# Patient Record
Sex: FEMALE | Race: WHITE
[De-identification: names, ages, dates, MRNs, and addresses within clinical notes are randomized per-mention and may not be internally consistent; named-entity substitution may affect disease eponyms.]

---

## 2022-03-18 PROBLEM — I83.93 VARICOSE VEINS OF BOTH LOWER EXTREMITIES: Status: ACTIVE | Noted: 2020-10-22

## 2022-03-19 PROBLEM — E78.5 HYPERLIPIDEMIA: Status: ACTIVE | Noted: 2020-10-22

## 2022-03-19 PROBLEM — F32.A DEPRESSION: Status: ACTIVE | Noted: 2020-10-22

## 2022-03-19 PROBLEM — G43.829 MENSTRUAL MIGRAINE WITHOUT STATUS MIGRAINOSUS, NOT INTRACTABLE: Status: ACTIVE | Noted: 2020-10-22

## 2022-03-20 PROBLEM — T43.205A ANTIDEPRESSANT DISCONTINUATION SYNDROME: Status: ACTIVE | Noted: 2021-12-01

## 2023-12-01 ENCOUNTER — OFFICE VISIT (OUTPATIENT)
Dept: INTERNAL MEDICINE CLINIC | Facility: CLINIC | Age: 38
End: 2023-12-01
Payer: COMMERCIAL

## 2023-12-01 VITALS
TEMPERATURE: 98.2 F | OXYGEN SATURATION: 98 % | WEIGHT: 201 LBS | HEART RATE: 69 BPM | SYSTOLIC BLOOD PRESSURE: 94 MMHG | BODY MASS INDEX: 30.46 KG/M2 | HEIGHT: 68 IN | DIASTOLIC BLOOD PRESSURE: 58 MMHG

## 2023-12-01 DIAGNOSIS — F33.42 RECURRENT MAJOR DEPRESSIVE DISORDER, IN FULL REMISSION (HCC): ICD-10-CM

## 2023-12-01 DIAGNOSIS — G43.829 MENSTRUAL MIGRAINE WITHOUT STATUS MIGRAINOSUS, NOT INTRACTABLE: ICD-10-CM

## 2023-12-01 DIAGNOSIS — Z98.84 HISTORY OF GASTRIC BYPASS: ICD-10-CM

## 2023-12-01 DIAGNOSIS — E66.9 CLASS 1 OBESITY WITHOUT SERIOUS COMORBIDITY WITH BODY MASS INDEX (BMI) OF 30.0 TO 30.9 IN ADULT, UNSPECIFIED OBESITY TYPE: ICD-10-CM

## 2023-12-01 DIAGNOSIS — J45.20 MILD INTERMITTENT ASTHMA WITHOUT COMPLICATION: ICD-10-CM

## 2023-12-01 DIAGNOSIS — E16.2 HYPOGLYCEMIA: ICD-10-CM

## 2023-12-01 DIAGNOSIS — E78.2 MIXED HYPERLIPIDEMIA: Primary | ICD-10-CM

## 2023-12-01 LAB
ALBUMIN SERPL-MCNC: 3.3 G/DL (ref 3.5–5)
ALBUMIN/GLOB SERPL: 1.1 (ref 0.4–1.6)
ALP SERPL-CCNC: 66 U/L (ref 50–136)
ALT SERPL-CCNC: 29 U/L (ref 12–65)
ANION GAP SERPL CALC-SCNC: 1 MMOL/L (ref 2–11)
AST SERPL-CCNC: 21 U/L (ref 15–37)
BASOPHILS # BLD: 0.1 K/UL (ref 0–0.2)
BASOPHILS NFR BLD: 1 % (ref 0–2)
BILIRUB SERPL-MCNC: 0.4 MG/DL (ref 0.2–1.1)
BUN SERPL-MCNC: 18 MG/DL (ref 6–23)
CALCIUM SERPL-MCNC: 9.3 MG/DL (ref 8.3–10.4)
CHLORIDE SERPL-SCNC: 107 MMOL/L (ref 101–110)
CHOLEST SERPL-MCNC: 198 MG/DL
CO2 SERPL-SCNC: 30 MMOL/L (ref 21–32)
CREAT SERPL-MCNC: 0.9 MG/DL (ref 0.6–1)
DIFFERENTIAL METHOD BLD: NORMAL
EOSINOPHIL # BLD: 0.2 K/UL (ref 0–0.8)
EOSINOPHIL NFR BLD: 2 % (ref 0.5–7.8)
ERYTHROCYTE [DISTWIDTH] IN BLOOD BY AUTOMATED COUNT: 12.2 % (ref 11.9–14.6)
FERRITIN SERPL-MCNC: 12 NG/ML (ref 8–388)
FOLATE SERPL-MCNC: 31.8 NG/ML (ref 3.1–17.5)
GLOBULIN SER CALC-MCNC: 3.1 G/DL (ref 2.8–4.5)
GLUCOSE SERPL-MCNC: 96 MG/DL (ref 65–100)
HCT VFR BLD AUTO: 43.3 % (ref 35.8–46.3)
HDLC SERPL-MCNC: 89 MG/DL (ref 40–60)
HDLC SERPL: 2.2
HGB BLD-MCNC: 14 G/DL (ref 11.7–15.4)
IMM GRANULOCYTES # BLD AUTO: 0.1 K/UL (ref 0–0.5)
IMM GRANULOCYTES NFR BLD AUTO: 1 % (ref 0–5)
IRON SERPL-MCNC: 128 UG/DL (ref 35–150)
LDLC SERPL CALC-MCNC: 96.8 MG/DL
LYMPHOCYTES # BLD: 1.4 K/UL (ref 0.5–4.6)
LYMPHOCYTES NFR BLD: 18 % (ref 13–44)
MCH RBC QN AUTO: 30.6 PG (ref 26.1–32.9)
MCHC RBC AUTO-ENTMCNC: 32.3 G/DL (ref 31.4–35)
MCV RBC AUTO: 94.5 FL (ref 82–102)
MONOCYTES # BLD: 0.4 K/UL (ref 0.1–1.3)
MONOCYTES NFR BLD: 5 % (ref 4–12)
NEUTS SEG # BLD: 5.8 K/UL (ref 1.7–8.2)
NEUTS SEG NFR BLD: 73 % (ref 43–78)
NRBC # BLD: 0 K/UL (ref 0–0.2)
PLATELET # BLD AUTO: 294 K/UL (ref 150–450)
PMV BLD AUTO: 9.8 FL (ref 9.4–12.3)
POTASSIUM SERPL-SCNC: 3.8 MMOL/L (ref 3.5–5.1)
PROT SERPL-MCNC: 6.4 G/DL (ref 6.3–8.2)
RBC # BLD AUTO: 4.58 M/UL (ref 4.05–5.2)
SODIUM SERPL-SCNC: 138 MMOL/L (ref 133–143)
TIBC SERPL-MCNC: 339 UG/DL (ref 250–450)
TRIGL SERPL-MCNC: 61 MG/DL (ref 35–150)
VIT B12 SERPL-MCNC: 1597 PG/ML (ref 193–986)
VLDLC SERPL CALC-MCNC: 12.2 MG/DL (ref 6–23)
WBC # BLD AUTO: 8 K/UL (ref 4.3–11.1)

## 2023-12-01 PROCEDURE — 99214 OFFICE O/P EST MOD 30 MIN: CPT | Performed by: INTERNAL MEDICINE

## 2023-12-01 RX ORDER — ALBUTEROL SULFATE 90 UG/1
1 AEROSOL, METERED RESPIRATORY (INHALATION) EVERY 4 HOURS PRN
Qty: 18 G | Refills: 3 | Status: SHIPPED | OUTPATIENT
Start: 2023-12-01

## 2023-12-01 RX ORDER — FERROUS SULFATE 325(65) MG
TABLET ORAL
COMMUNITY

## 2023-12-01 RX ORDER — PHENTERMINE HYDROCHLORIDE 37.5 MG/1
37.5 TABLET ORAL
Qty: 30 TABLET | Refills: 2 | Status: SHIPPED | OUTPATIENT
Start: 2023-12-01 | End: 2024-02-29

## 2023-12-01 ASSESSMENT — PATIENT HEALTH QUESTIONNAIRE - PHQ9
9. THOUGHTS THAT YOU WOULD BE BETTER OFF DEAD, OR OF HURTING YOURSELF: 0
SUM OF ALL RESPONSES TO PHQ QUESTIONS 1-9: 0
5. POOR APPETITE OR OVEREATING: 0
8. MOVING OR SPEAKING SO SLOWLY THAT OTHER PEOPLE COULD HAVE NOTICED. OR THE OPPOSITE, BEING SO FIGETY OR RESTLESS THAT YOU HAVE BEEN MOVING AROUND A LOT MORE THAN USUAL: 0
2. FEELING DOWN, DEPRESSED OR HOPELESS: 0
6. FEELING BAD ABOUT YOURSELF - OR THAT YOU ARE A FAILURE OR HAVE LET YOURSELF OR YOUR FAMILY DOWN: 0
10. IF YOU CHECKED OFF ANY PROBLEMS, HOW DIFFICULT HAVE THESE PROBLEMS MADE IT FOR YOU TO DO YOUR WORK, TAKE CARE OF THINGS AT HOME, OR GET ALONG WITH OTHER PEOPLE: 0
7. TROUBLE CONCENTRATING ON THINGS, SUCH AS READING THE NEWSPAPER OR WATCHING TELEVISION: 0
1. LITTLE INTEREST OR PLEASURE IN DOING THINGS: 0
4. FEELING TIRED OR HAVING LITTLE ENERGY: 0
SUM OF ALL RESPONSES TO PHQ9 QUESTIONS 1 & 2: 0
3. TROUBLE FALLING OR STAYING ASLEEP: 0

## 2023-12-01 ASSESSMENT — COLUMBIA-SUICIDE SEVERITY RATING SCALE - C-SSRS
5. HAVE YOU STARTED TO WORK OUT OR WORKED OUT THE DETAILS OF HOW TO KILL YOURSELF? DO YOU INTEND TO CARRY OUT THIS PLAN?: NO
4. HAVE YOU HAD THESE THOUGHTS AND HAD SOME INTENTION OF ACTING ON THEM?: NO
7. DID THIS OCCUR IN THE LAST THREE MONTHS: NO
3. HAVE YOU BEEN THINKING ABOUT HOW YOU MIGHT KILL YOURSELF?: NO

## 2023-12-01 NOTE — PROGRESS NOTES
Pau Hart D.O. [address], [address] [facility] [address]  Tel: [phone]    Office Visit: Follow Up     Patient Name: Isaac Dela Cruz   :  1985   MRN:   [id number]      Today's Date: 23 6:53 AM    Subjective     The patient is a 41y.o.-year-old female who presents for follow-up. Hyperlipidemia  -Lipid panel from 11/3/2021 showed a total cholesterol 182, HDL of 77, LDL of 94, triglycerides of 57  -she is trying to get back to the gym, she is going 3-5 days a week, she is drinking a lot of protein shakes, and she is trying to eat healthy. History of depression  -Patient was on Lexapro in the past, she weaned off this but had some difficulty with antidepressant discontinuation syndrome.  -She got  from the father of her children, and   her [employer]eb[employer]ee Dues" and is now going through another divorce. She states her weight is going up some. She states she thinks her depression is doing well and does not want to go back on medication. Obesity  -BMI 30.56, weight 201 lbs. Patient had appointment with bariatric surgery 2022 for follow-up 1 year status post laparoscopic gastric bypass surgery on 2021.  -She was to follow-up with bariatric surgery at the 1 year teo but did not follow-up.  -she is trying to get back to the gym, she is going 3-5 days a week, she is drinking a lot of protein shakes, and she is trying to eat healthy. Asthma  -Albuterol Inhaler prn, she rarely uses this. Menstrual migraines  -she is not having migraines anymore, she has an IUD. Varicose veins of lower extremities  -she states these are stable. Vitamin D deficiency  -she is not taking vitamin D, she is taking a bariatric vitamin. Health maintenance  -Patient is due for Pap smear    Today:   Patient states that her blood sugars have been low and that she passed out about a week ago when her son found her on the floor.   She hit her

## 2023-12-02 LAB — INSULIN SERPL-ACNC: 14.1 UIU/ML (ref 2.6–24.9)

## 2023-12-06 ENCOUNTER — TELEPHONE (OUTPATIENT)
Dept: INTERNAL MEDICINE CLINIC | Facility: CLINIC | Age: 38
End: 2023-12-06

## 2023-12-06 NOTE — TELEPHONE ENCOUNTER
Prior authorization approved Case ID: [id number]      Payer: [employer] Patient's Payer    [phone]   Your PA request has been approved. Additional information will be provided in the approval communication.  (Message [phone])   Approval Details    Authorized from December 4, 2023 to March 4, 2024      Electronic appeal: Not supported

## 2023-12-07 ENCOUNTER — TELEPHONE (OUTPATIENT)
Dept: INTERNAL MEDICINE CLINIC | Facility: CLINIC | Age: 38
End: 2023-12-07

## 2023-12-07 NOTE — TELEPHONE ENCOUNTER
Dr Rossy Manning called patient yesterday, but she had to go back to work and missed the call. Patient is off today and can be reached at anytime.

## 2024-02-28 ENCOUNTER — TELEPHONE (OUTPATIENT)
Dept: INTERNAL MEDICINE CLINIC | Facility: CLINIC | Age: 39
End: 2024-02-28

## 2024-02-28 NOTE — TELEPHONE ENCOUNTER
Attempt to contact patient to discuss their concerns was unsuccessful.  Voicemail made to return call.

## 2024-02-28 NOTE — TELEPHONE ENCOUNTER
----- Message from LaAlfred Burt sent at 2/27/2024 11:48 AM EST -----  Subject: Appointment Request    Reason for Call: Established Patient Appointment needed: Routine Existing   Condition Follow Up    QUESTIONS    Reason for appointment request? Available appointments did not meet   patient need     Additional Information for Provider? Pt would like to R/S her appt on   03/01/24 due pt is having a cleanse with her nutritionist and she would   like to see her PCP after. Pt would like to r/S for 03/15/24 if possible.   PCPs next available isnt until may and Pt is not happy about that. Pt   would like to be seen in March please contact Pt to R/S.   ---------------------------------------------------------------------------  --------------  CALL BACK INFO  [phone]; OK to leave message on [employer],OK to respond with electronic   message via [employer] portal (only for patients who have registered [employer]   account)  ---------------------------------------------------------------------------  --------------  SCRIPT ANSWERS

## 2024-07-31 ENCOUNTER — HOSPITAL ENCOUNTER (EMERGENCY)
Age: 39
Discharge: HOME | End: 2024-07-31

## 2024-07-31 ENCOUNTER — APPOINTMENT (OUTPATIENT)
Dept: GENERAL RADIOLOGY | Age: 39
End: 2024-07-31

## 2024-07-31 VITALS
DIASTOLIC BLOOD PRESSURE: 66 MMHG | BODY MASS INDEX: 28.88 KG/M2 | HEIGHT: 69 IN | WEIGHT: 195 LBS | HEART RATE: 70 BPM | SYSTOLIC BLOOD PRESSURE: 99 MMHG | TEMPERATURE: 99.1 F | RESPIRATION RATE: 16 BRPM | OXYGEN SATURATION: 96 %

## 2024-07-31 DIAGNOSIS — S92.511B OPEN DISPLACED FRACTURE OF PROXIMAL PHALANX OF LESSER TOE OF RIGHT FOOT, INITIAL ENCOUNTER: Primary | ICD-10-CM

## 2024-07-31 PROCEDURE — 73630 X-RAY EXAM OF FOOT: CPT

## 2024-07-31 PROCEDURE — 73562 X-RAY EXAM OF KNEE 3: CPT

## 2024-07-31 PROCEDURE — 6370000000 HC RX 637 (ALT 250 FOR IP): Performed by: STUDENT IN AN ORGANIZED HEALTH CARE EDUCATION/TRAINING PROGRAM

## 2024-07-31 PROCEDURE — 2500000003 HC RX 250 WO HCPCS: Performed by: STUDENT IN AN ORGANIZED HEALTH CARE EDUCATION/TRAINING PROGRAM

## 2024-07-31 PROCEDURE — 99283 EMERGENCY DEPT VISIT LOW MDM: CPT

## 2024-07-31 RX ORDER — ONDANSETRON 4 MG/1
4 TABLET, ORALLY DISINTEGRATING ORAL
Status: COMPLETED | OUTPATIENT
Start: 2024-07-31 | End: 2024-07-31

## 2024-07-31 RX ORDER — LIDOCAINE HYDROCHLORIDE AND EPINEPHRINE 10; 10 MG/ML; UG/ML
20 INJECTION, SOLUTION INFILTRATION; PERINEURAL
Status: COMPLETED | OUTPATIENT
Start: 2024-07-31 | End: 2024-07-31

## 2024-07-31 RX ORDER — CEPHALEXIN 500 MG/1
500 CAPSULE ORAL 4 TIMES DAILY
Qty: 28 CAPSULE | Refills: 0 | Status: SHIPPED | OUTPATIENT
Start: 2024-07-31 | End: 2024-08-07

## 2024-07-31 RX ORDER — LORAZEPAM 0.5 MG/1
0.5 TABLET ORAL
Status: COMPLETED | OUTPATIENT
Start: 2024-07-31 | End: 2024-07-31

## 2024-07-31 RX ORDER — ONDANSETRON 4 MG/1
4 TABLET, FILM COATED ORAL 3 TIMES DAILY PRN
Qty: 15 TABLET | Refills: 0 | Status: SHIPPED | OUTPATIENT
Start: 2024-07-31

## 2024-07-31 RX ORDER — HYDROCODONE BITARTRATE AND ACETAMINOPHEN 7.5; 325 MG/1; MG/1
1 TABLET ORAL EVERY 6 HOURS PRN
Qty: 15 TABLET | Refills: 0 | Status: SHIPPED | OUTPATIENT
Start: 2024-07-31 | End: 2024-07-31 | Stop reason: ALTCHOICE

## 2024-07-31 RX ORDER — OXYCODONE HYDROCHLORIDE 5 MG/1
5 TABLET ORAL EVERY 6 HOURS PRN
Qty: 12 TABLET | Refills: 0 | Status: SHIPPED | OUTPATIENT
Start: 2024-07-31 | End: 2024-08-03

## 2024-07-31 RX ORDER — HYDROCODONE BITARTRATE AND ACETAMINOPHEN 5; 325 MG/1; MG/1
1 TABLET ORAL
Status: COMPLETED | OUTPATIENT
Start: 2024-07-31 | End: 2024-07-31

## 2024-07-31 RX ORDER — HYDROCODONE BITARTRATE AND ACETAMINOPHEN 5; 325 MG/1; MG/1
1 TABLET ORAL EVERY 6 HOURS PRN
Qty: 12 TABLET | Refills: 0 | Status: SHIPPED | OUTPATIENT
Start: 2024-07-31 | End: 2024-07-31 | Stop reason: ALTCHOICE

## 2024-07-31 RX ADMIN — ONDANSETRON 4 MG: 4 TABLET, ORALLY DISINTEGRATING ORAL at 18:55

## 2024-07-31 RX ADMIN — LIDOCAINE HYDROCHLORIDE,EPINEPHRINE BITARTRATE 20 ML: 10; .01 INJECTION, SOLUTION INFILTRATION; PERINEURAL at 18:57

## 2024-07-31 RX ADMIN — LORAZEPAM 0.5 MG: 0.5 TABLET ORAL at 18:56

## 2024-07-31 RX ADMIN — HYDROCODONE BITARTRATE AND ACETAMINOPHEN 1 TABLET: 5; 325 TABLET ORAL at 18:56

## 2024-07-31 ASSESSMENT — PAIN DESCRIPTION - ORIENTATION
ORIENTATION: RIGHT
ORIENTATION: RIGHT

## 2024-07-31 ASSESSMENT — PAIN - FUNCTIONAL ASSESSMENT
PAIN_FUNCTIONAL_ASSESSMENT: 0-10
PAIN_FUNCTIONAL_ASSESSMENT: NONE - DENIES PAIN
PAIN_FUNCTIONAL_ASSESSMENT: 0-10

## 2024-07-31 ASSESSMENT — PAIN SCALES - GENERAL
PAINLEVEL_OUTOF10: 0
PAINLEVEL_OUTOF10: 10
PAINLEVEL_OUTOF10: 10
PAINLEVEL_OUTOF10: 0
PAINLEVEL_OUTOF10: 3

## 2024-07-31 ASSESSMENT — PAIN DESCRIPTION - DESCRIPTORS
DESCRIPTORS: DULL;SHARP;THROBBING
DESCRIPTORS: ACHING;SORE

## 2024-07-31 ASSESSMENT — ENCOUNTER SYMPTOMS
TROUBLE SWALLOWING: 0
SHORTNESS OF BREATH: 0
PHOTOPHOBIA: 0
COUGH: 0
VOMITING: 0
FACIAL SWELLING: 0
ABDOMINAL PAIN: 0

## 2024-07-31 ASSESSMENT — LIFESTYLE VARIABLES
HOW MANY STANDARD DRINKS CONTAINING ALCOHOL DO YOU HAVE ON A TYPICAL DAY: 1 OR 2
HOW OFTEN DO YOU HAVE A DRINK CONTAINING ALCOHOL: MONTHLY OR LESS

## 2024-07-31 ASSESSMENT — PAIN DESCRIPTION - LOCATION
LOCATION: KNEE
LOCATION: KNEE;TOE (COMMENT WHICH ONE)

## 2024-07-31 NOTE — ED PROVIDER NOTES
repair, nail removal.  ED attending physician present in department at time of care. Based on current hospital policy, their co-signature is not required on this note.   I independently ordered and reviewed each unique test.     The patients assessment required an independent historian: Family.  The reason they were needed is important historical information not provided by the patient.  I interpreted the X-rays toe fracture.  RADIOLOGY DISCLAIMER: Although I do my best to interpret the imaging, I am not a board-certified radiologist.  I am still basing my medical decision making off of the board-certified radiology interpretation provided to me.              History     39-year-old female patient presents today complaining of a fall that occurred about an hour ago while in the shower.  Reports she got her right toe stuck in the drain and since then has had pain in the right second toe as well as the right knee.  Reports she feels like her right toe may be broken or dislocated because it is not point in the correct direction.  She states she has a laceration to the distal tip of the right second toe.  She denies inability to bear weight.  She denies head injury.  Denies any other symptoms today.  Patient is primary historian.    The history is provided by the patient. No  was used.       ROS     Review of Systems   Constitutional:  Negative for fever.   HENT:  Negative for facial swelling and trouble swallowing.    Eyes:  Negative for photophobia and visual disturbance.   Respiratory:  Negative for cough and shortness of breath.    Cardiovascular:  Negative for chest pain.   Gastrointestinal:  Negative for abdominal pain and vomiting.   Genitourinary:  Negative for dysuria.   Musculoskeletal:  Positive for arthralgias. Negative for myalgias and neck stiffness.   Skin:  Positive for wound. Negative for rash.   Neurological:  Negative for dizziness, syncope, weakness and light-headedness.

## 2024-07-31 NOTE — ED TRIAGE NOTES
Pt ambulatory to triage with use of crutches, mother by side. Per patient, she slipped and fell in the shower and her her right foot went into the drain. Obvious deformity to right second toe with loose toe nail. Pt c/o shooting pain up right leg from affected toe and R knee pain.

## 2024-08-01 ENCOUNTER — TELEPHONE (OUTPATIENT)
Dept: ORTHOPEDIC SURGERY | Age: 39
End: 2024-08-01

## 2024-08-01 NOTE — TELEPHONE ENCOUNTER
Urgent Ed referral  As an open fx do you want to see it or do you want me to ask MET?    Open displaced fracture of proximal phalanx of lesser toe of right   THanks

## 2024-08-01 NOTE — ED NOTES
Patient mobility status  in wheelchair. Provider aware     I have reviewed discharge instructions with the patient.  The patient verbalized understanding.    Patient left ED via Discharge Method: wheelchair to Home with Parent.    Opportunity for questions and clarification provided.     Patient given 3 scripts.

## 2024-08-02 ENCOUNTER — TELEPHONE (OUTPATIENT)
Dept: ORTHOPEDIC SURGERY | Age: 39
End: 2024-08-02

## 2024-08-02 NOTE — TELEPHONE ENCOUNTER
Called and lvm for pt , calling to make an apt per  needs to be seen by  or   but he is willing to see in the meantime until she is able to get seen by his partner.

## 2024-08-05 ENCOUNTER — OFFICE VISIT (OUTPATIENT)
Dept: ORTHOPEDIC SURGERY | Age: 39
End: 2024-08-05

## 2024-08-05 DIAGNOSIS — S92.512A DISPLACED FRACTURE OF PROXIMAL PHALANX OF LEFT LESSER TOE(S), INITIAL ENCOUNTER FOR CLOSED FRACTURE: Primary | ICD-10-CM

## 2024-08-05 PROCEDURE — 99204 OFFICE O/P NEW MOD 45 MIN: CPT | Performed by: NURSE PRACTITIONER

## 2024-08-05 NOTE — PROGRESS NOTES
Name: Rachelle Lal  YOB: 1985  Gender: female  MRN: [id number]    Summary:   Displaced fracture of right second proximal phalanx       CC: New Patient (Right foot, fx/)       HPI: Rachelle Lal is a 39 y.o. female who presents with New Patient (Right foot, fx/)  .  Patient reports about a week ago an episode in the shower where she slipped and fell.  Her second toe got stuck in the tub drain, in addition to the toe she hit her legs, her back and head.  She was seen in the [facility] where x-rays were obtained.  She was given antibiotics as well as pain medicine.  She does report stopping the antibiotic yesterday due to a rash forming on her back.    History was obtained by Patient     ROS/Meds/PSH/PMH/FH/SH: I personally reviewed the patients standard intake form.  Below are the pertinents    Tobacco:  reports that she has never smoked. She has never used smokeless tobacco.  Diabetes: None      Physical Examination:    Upon examination the foot is considerably swollen today.  There is noted bruising through the second toe as well as at the base of the second MTP joint.  The second phalanx is curved towards the great toe.  She has palpable pulses and intact sensation to the foot.    Imaging:   Interpretation of imaging and I reviewed imaging performed by another physician in my group.  [facility] obtain these x-rays.  Right foot XR: AP, Lateral, Oblique views   No diagnosis found.   Report: AP, lateral, oblique x-ray of the right foot demonstrates displaced fracture of second proximal phalanx    Impression: Displaced fracture of second proximal phalanx   Ariella Colvin, APRN - CNP           Assessment:   Displaced fracture of right second proximal phalanx.  I did recommend surgery for the patient today, we discussed what this process would look like as well as the recovery for it.  The patient is a single mother of 4 children and depends heavily on driving to help

## 2024-08-06 ENCOUNTER — TELEPHONE (OUTPATIENT)
Dept: ORTHOPEDIC SURGERY | Age: 39
End: 2024-08-06

## 2024-08-06 ENCOUNTER — HOSPITAL ENCOUNTER (OUTPATIENT)
Dept: GENERAL RADIOLOGY | Age: 39
Discharge: HOME | End: 2024-08-09

## 2024-08-06 DIAGNOSIS — T14.8XXA FRACTURE: ICD-10-CM

## 2024-08-06 PROCEDURE — 73620 X-RAY EXAM OF FOOT: CPT

## 2024-08-06 NOTE — TELEPHONE ENCOUNTER
Pt would like to cx her surgery for today. She says she had a appt with her PCP and she will callback if she needs further treatment.